# Patient Record
Sex: MALE | Race: BLACK OR AFRICAN AMERICAN
[De-identification: names, ages, dates, MRNs, and addresses within clinical notes are randomized per-mention and may not be internally consistent; named-entity substitution may affect disease eponyms.]

---

## 2018-05-30 ENCOUNTER — HOSPITAL ENCOUNTER (EMERGENCY)
Dept: HOSPITAL 72 - EMR | Age: 48
Discharge: HOME | End: 2018-05-30
Payer: COMMERCIAL

## 2018-05-30 VITALS — HEIGHT: 65 IN | WEIGHT: 215 LBS | BODY MASS INDEX: 35.82 KG/M2

## 2018-05-30 VITALS — SYSTOLIC BLOOD PRESSURE: 134 MMHG | DIASTOLIC BLOOD PRESSURE: 78 MMHG

## 2018-05-30 VITALS — DIASTOLIC BLOOD PRESSURE: 78 MMHG | SYSTOLIC BLOOD PRESSURE: 134 MMHG
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
## 2018-05-30 DIAGNOSIS — Z23: ICD-10-CM

## 2018-05-30 DIAGNOSIS — S81.012A: Primary | ICD-10-CM

## 2018-05-30 PROCEDURE — 90471 IMMUNIZATION ADMIN: CPT

## 2018-05-30 PROCEDURE — 90715 TDAP VACCINE 7 YRS/> IM: CPT

## 2018-05-30 PROCEDURE — 96372 THER/PROPH/DIAG INJ SC/IM: CPT

## 2018-05-30 PROCEDURE — 12032 INTMD RPR S/A/T/EXT 2.6-7.5: CPT

## 2018-05-30 PROCEDURE — 99283 EMERGENCY DEPT VISIT LOW MDM: CPT

## 2018-05-30 NOTE — EMERGENCY ROOM REPORT
History of Present Illness


General


Chief Complaint:  Laceration


Source:  Patient





Present Illness


HPI


47-year-old male p/w laceration to left knee. sustained when he fell, he has 

been able to ambulate without issue and has no pain with any movement of his 

like. no other complaints. 


Tdap is not up-to-date


Allergies:  


Coded Allergies:  


     No Known Allergies (Unverified , 5/30/18)





Patient History


Past Medical History:  see triage record


Past Surgical History:  none


Pertinent Family History:  none


Reviewed Nursing Documentation:  PMH: Agreed; PSxH: Agreed





Nursing Documentation-PMH


Past Medical History:  No Stated History





Review of Systems


All Other Systems:  negative except mentioned in HPI





Physical Exam





Vital Signs








  Date Time  Temp Pulse Resp B/P (MAP) Pulse Ox O2 Delivery O2 Flow Rate FiO2


 


5/30/18 01:39 98.3 94 18 132/74 99 Room Air  





 98.2       








Sp02 EP Interpretation:  reviewed, normal


General Appearance:  normal inspection, well appearing, no apparent distress, 

alert, GCS 15, non-toxic


Head:  normocephalic, atraumatic


Eyes:  bilateral eye normal inspection, bilateral eye PERRL, bilateral eye EOMI


ENT:  normal ENT inspection


Neck:  normal inspection


Respiratory:  normal inspection


Cardiovascular #1:  normal inspection


Cardiovascular #2:  2+ radial (R), 2+ radial (L)


Gastrointestinal:  normal inspection, soft


Genitourinary:  no CVA tenderness


Musculoskeletal:  other - Gaping laceration noted to the lateral knee, about 4 

centimeters, however patient has full range of motion of knee and good motor 

strength without issue no laxity


Neurologic:  normal inspection, alert, oriented x3, responsive, motor strength/

tone normal, sensory intact, normal gait, speech normal


Psychiatric:  normal inspection, judgement/insight normal, memory normal


Skin:  normal inspection, normal color, no rash, warm/dry, well hydrated, 

normal turgor





Procedures


Laceration/Wound Repair


Laceration/Wound Repair :  


   Consent:  Verbal


   Wound Location:  lower extremity


   Wound's Depth, Shape:  superficial


   Wound Length (cm):  4


   Irrigated w/ Saline (ccs):  500


   Betadine Prep?:  Yes


   Anesthesia:  1% Lidocaine


   Volume Anesthetic (ccs):  5


   Wound Repaired With:  sutures


   Suture Size/Type:  4:0, nylon


   Number of Sutures:  11


   Deep Layer Suture Size/Type:  4:0, chromic


   Number Deep Layer Sutures:  3


   Sterile Dressing Applied?:  Yes


   Patient Tolerated:  Well


   Complications:  None





Medical Decision Making


Diagnostic Impression:  


 Primary Impression:  


 Knee laceration


ER Course


47-year-old male p/w laceration





DDX:


Laceration, no signs of infection





ER course:


Laceration repaired, bacitracin with sterile dressing applied. 


Tdap given. 





Disposition:


Patient will be discharged home. Strict return precautions discussed with 

patient such as fever, chills, increasing bleeding to site, purulent drainage, 

rapid swelling or redness to area. Patient verbalizes understanding. 


Patient instructed to return to ED or their primary care doctor in 14 days for 

removal of sutures. Patient agrees with plan.


 


Please note that this Emergency Department Report was dictated using Si[employer] technology software, occasionally this can lead to 

erroneous entry secondary to interpretation by the dictation equipment





Last Vital Signs








  Date Time  Temp Pulse Resp B/P (MAP) Pulse Ox O2 Delivery O2 Flow Rate FiO2


 


5/30/18 01:39 98.3 94 18 132/74 99 Room Air  





 98.2       








Disposition:  HOME, SELF-CARE


Condition:  Improved


Patient Instructions:  Laceration Care, Adult





Additional Instructions:  


PLEASE SEE YOUR PCP OR COME BACK TO THE EMERGENCY ROOM IN 14 DAYS FOR SUTURE 

REMOVAL











Xiomy Barbosa M.D. May 30, 2018 02:41

## 2018-06-13 ENCOUNTER — HOSPITAL ENCOUNTER (EMERGENCY)
Dept: HOSPITAL 72 - EMR | Age: 48
LOS: 1 days | Discharge: HOME | End: 2018-06-14
Payer: COMMERCIAL

## 2018-06-13 VITALS — SYSTOLIC BLOOD PRESSURE: 136 MMHG | DIASTOLIC BLOOD PRESSURE: 78 MMHG

## 2018-06-13 VITALS — HEIGHT: 66 IN | WEIGHT: 250 LBS | BODY MASS INDEX: 40.18 KG/M2
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
## 2018-06-13 DIAGNOSIS — S81.012D: Primary | ICD-10-CM

## 2018-06-13 DIAGNOSIS — Z48.02: ICD-10-CM

## 2018-06-13 PROCEDURE — 99282 EMERGENCY DEPT VISIT SF MDM: CPT

## 2018-06-13 NOTE — EMERGENCY ROOM REPORT
History of Present Illness


General


Chief Complaint:  Wound Recheck/Suture Removal


Source:  Patient





Present Illness


HPI


Is a 47-year-old male who presents with chief complaint of suture removal.  He 

fell on May 30 and sustained a laceration to his left knee.  He was seen here 

and had sutured done.  no Complications since.  He came in for suture removal.


Allergies:  


Coded Allergies:  


     No Known Allergies (Unverified , 5/30/18)





Patient History


Past Medical History:  see triage record, old chart reviewed


Past Surgical History:  other


Pertinent Family History:  none


Social History:  Denies: smoking


Immunizations:  other


Reviewed Nursing Documentation:  PMH: Agreed; PSxH: Agreed





Nursing Documentation-PMH


Hx Diabetes:  Yes





Review of Systems


Eye:  Denies: eye pain, blurred vision


ENT:  Denies: ear pain, nose congestion, throat swelling


Respiratory:  Denies: cough, shortness of breath


Cardiovascular:  Denies: chest pain, palpitations


Gastrointestinal:  Denies: abdominal pain, diarrhea, nausea, vomiting


Musculoskeletal:  Denies: back pain, joint pain


Skin:  Denies: rash


Neurological:  Denies: headache, numbness


Endocrine:  Denies: increased thirst, increased urine


Hematologic/Lymphatic:  Denies: easy bruising


All Other Systems:  negative except mentioned in HPI





Physical Exam





Vital Signs








  Date Time  Temp Pulse Resp B/P (MAP) Pulse Ox O2 Delivery O2 Flow Rate FiO2


 


6/13/18 23:11 98.6 107 16 131/86 94 Room Air  





 98.6       





vital signs unremarkab


Sp02 EP Interpretation:  reviewed, normal


General Appearance:  well appearing, no apparent distress, alert


Head:  normocephalic, atraumatic


Eyes:  bilateral eye PERRL, bilateral eye EOMI


ENT:  hearing grossly normal, normal pharynx


Neck:  full range of motion, supple, no meningismus


Respiratory:  chest non-tender, lungs clear, normal breath sounds


Cardiovascular #1:  regular rate, rhythm, no murmur


Gastrointestinal:  normal bowel sounds, non tender, no mass, no organomegaly, 

no bruit, non-distended


Musculoskeletal:  back normal, gait/station normal, normal range of motion, 

other - left knee: Suture site clean without evidence of infection.


Psychiatric:  mood/affect normal


Skin:  warm/dry





Procedures


Additional Procedure


Procedure Narrative


Procedure: Suture removal


Indication: Scheduled removal


Description: I cleaned the area with chlorhexidine.  I remove the suture using 

a scissor and tweezer.  No complication.  Patient tolerated procedure without 

problem.





Medical Decision Making


Diagnostic Impression:  


 Primary Impression:  


 Encounter for removal of sutures


ER Course


Patient here for suture removal.  No complication.  No infection.





Last Vital Signs








  Date Time  Temp Pulse Resp B/P (MAP) Pulse Ox O2 Delivery O2 Flow Rate FiO2


 


6/13/18 23:20  78 18 136/78 96 Room Air  


 


6/13/18 23:11 98.6       





 98.6       








Status:  improved


Disposition:  HOME, SELF-CARE


Condition:  Stable


Referrals:  


NON PHYSICIAN (PCP)





Additional Instructions:  


follow-up with your DrSara in 7 days as needed. Return for any concerns.











REKHA ENRIQUE M.D. Jun 13, 2018 23:43

## 2018-06-14 VITALS — SYSTOLIC BLOOD PRESSURE: 140 MMHG | DIASTOLIC BLOOD PRESSURE: 75 MMHG

## 2018-06-14 VITALS — DIASTOLIC BLOOD PRESSURE: 75 MMHG | SYSTOLIC BLOOD PRESSURE: 140 MMHG

## 2019-06-15 ENCOUNTER — HOSPITAL ENCOUNTER (EMERGENCY)
Dept: HOSPITAL 72 - EMR | Age: 49
Discharge: HOME | End: 2019-06-15
Payer: COMMERCIAL

## 2019-06-15 VITALS — DIASTOLIC BLOOD PRESSURE: 89 MMHG | SYSTOLIC BLOOD PRESSURE: 134 MMHG

## 2019-06-15 VITALS — HEIGHT: 65 IN | WEIGHT: 209 LBS | BODY MASS INDEX: 34.82 KG/M2

## 2019-06-15 DIAGNOSIS — E11.9: ICD-10-CM

## 2019-06-15 DIAGNOSIS — L03.011: Primary | ICD-10-CM

## 2019-06-15 PROCEDURE — 99282 EMERGENCY DEPT VISIT SF MDM: CPT

## 2019-06-15 PROCEDURE — 10060 I&D ABSCESS SIMPLE/SINGLE: CPT

## 2019-06-15 NOTE — EMERGENCY ROOM REPORT
History of Present Illness


General


Chief Complaint:  Pain


Source:  Patient





Present Illness


HPI


This is a 48-year-old male who is right-hand dominant.  He presents with chief 

complaint of right fifth finger pain.  Onset for last 4 days.  He had a 

hangnail that he pulled off.  Now swollen and tender.  Pain is 8 out of 10.  

Worse with movement or palpation.  Better with rest.  No drainage.  No fever.  

Denies any other complaint.


Allergies:  


Coded Allergies:  


     No Known Allergies (Unverified , 5/30/18)





Patient History


Past Medical History:  see triage record, old chart reviewed, DM


Past Surgical History:  other


Pertinent Family History:  none


Social History:  Denies: smoking


Immunizations:  other


Reviewed Nursing Documentation:  PMH: Agreed; PSxH: Agreed





Nursing Documentation-PMH


Past Medical History:  No History, Except For


Hx Diabetes:  Yes - 2





Review of Systems


Eye:  Denies: eye pain, blurred vision


ENT:  Denies: ear pain, nose congestion, throat swelling


Respiratory:  Denies: cough, shortness of breath


Cardiovascular:  Denies: chest pain, palpitations


Gastrointestinal:  Denies: abdominal pain, diarrhea, nausea, vomiting


Musculoskeletal:  Reports: joint pain; Denies: back pain


Skin:  Denies: rash


Neurological:  Denies: headache, numbness


Endocrine:  Denies: increased thirst, increased urine


Hematologic/Lymphatic:  Denies: easy bruising


All Other Systems:  negative except mentioned in HPI





Physical Exam





Vital Signs








  Date Time  Temp Pulse Resp B/P (MAP) Pulse Ox O2 Delivery O2 Flow Rate FiO2


 


6/15/19 20:44 98.8 74 16 138/91 (107) 97 Room Air  





Vitals unremarkable


Sp02 EP Interpretation:  reviewed, normal


General Appearance:  well appearing, no apparent distress, alert


Head:  normocephalic, atraumatic


Eyes:  bilateral eye PERRL, bilateral eye EOMI


ENT:  hearing grossly normal, normal pharynx


Neck:  full range of motion, supple, no meningismus


Respiratory:  chest non-tender, lungs clear, normal breath sounds


Cardiovascular #1:  regular rate, rhythm, no murmur


Gastrointestinal:  normal bowel sounds, non tender, no mass, no organomegaly, 

no bruit, non-distended


Musculoskeletal:  back normal, gait/station normal, normal range of motion, 

other - Right fifth finger: Paronychia on the lateral aspect.  No felon


Psychiatric:  mood/affect normal


Skin:  warm/dry





Procedures


Incision and Drainage


Incision and Drainage :  


   Consent:  Verbal


   Site:  Rt 5th finger


   Blade Size:  11


   I & D Procedure:  betadine prep


   Patient Tolerated:  Well


   Complications:  None


Progress


Using an 11 blade scalpel, I ran along the cuticle of the fifth finger.  There 

was small amount of pus expressed.  Patient tolerated procedure without any 

problem.





Medical Decision Making


Diagnostic Impression:  


 Primary Impression:  


 Paronychia of finger of right hand


ER Course


Patient with paronychia. no felon. no deep infection,.





Last Vital Signs








  Date Time  Temp Pulse Resp B/P (MAP) Pulse Ox O2 Delivery O2 Flow Rate FiO2


 


6/15/19 20:44 98.8 74 16 138/91 (107) 97 Room Air  








Status:  improved


Disposition:  HOME, SELF-CARE


Condition:  Stable


Scripts


Trimethoprim/Sulfamethoxazole 160/800* (BACTRIM DS TABLET*) 1 Each Tablet


1 TAB ORAL Q12H, #14 TAB 0 Refills


   Prov: Mervin Zuleta MD         6/15/19





Additional Instructions:  


Keep wound clean. clean first with hydrogen peroxide. Then apply antibiotic 

ointment. Follow up with your doctor in 7 days for  recheck. Return if worse.











Mervin Zuleta MD Jigar 15, 2019 21:02

## 2019-06-15 NOTE — NUR
ED Nurse Note:

 Pt arrived ED from home, c/o right the 5 th finger was injuried and pain with 
abscess. Pt is A/O X 4. VSS, waing for orders.

## 2019-06-15 NOTE — NUR
ER DISCHARGE NOTE:

Patient is cleared to be discharged per Dr. Zuleta. I&D  was done by MD. Pt is 
aox4 on room air with stable vital signs. Pt  was given dc and prescription 
instructions. Pt was able to verbalize understanding. Pt's  id band removed. Pt 
is able to ambulate with steady gait and took all belongings.

## 2019-08-08 ENCOUNTER — HOSPITAL ENCOUNTER (EMERGENCY)
Dept: HOSPITAL 72 - EMR | Age: 49
Discharge: HOME | End: 2019-08-08
Payer: COMMERCIAL

## 2019-08-08 VITALS — DIASTOLIC BLOOD PRESSURE: 77 MMHG | SYSTOLIC BLOOD PRESSURE: 134 MMHG

## 2019-08-08 VITALS — WEIGHT: 203 LBS | HEIGHT: 67 IN | BODY MASS INDEX: 31.86 KG/M2

## 2019-08-08 DIAGNOSIS — E11.9: ICD-10-CM

## 2019-08-08 DIAGNOSIS — Z79.4: ICD-10-CM

## 2019-08-08 DIAGNOSIS — S39.012A: Primary | ICD-10-CM
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
## 2019-08-08 PROCEDURE — 96372 THER/PROPH/DIAG INJ SC/IM: CPT

## 2019-08-08 PROCEDURE — 99283 EMERGENCY DEPT VISIT LOW MDM: CPT

## 2019-08-08 NOTE — NUR
ER DISCHARGE NOTE:

Patient is cleared to be discharged per ERMD, pt is aox4, on room air, with 
stable vital signs. pt was given dc and prescription instructions, pt was able 
to verbalize understanding, pt id band  removed without complications. pt is 
able to ambulate with can use to steady gait. pt took all belongings.

## 2019-08-08 NOTE — EMERGENCY ROOM REPORT
History of Present Illness


General


Chief Complaint:  Lower Back Pain or Injury


Source:  Patient





Present Illness


HPI


49-year-old male with history of type 2 diabetes currently controlled with 

insulin here complaining of 1 day of lower back pain that started after doing 

some yard work yesterday.  Patient denies falling on his back or injuring his 

back.  Patient reports that he was leaning forward working in his yard as he 

suddenly felt pulled muscle being spastic in the left lower back radiating to 

the left leg.  Patient denies any tingling numbness, saddle paresthesia, 

urinary or bowel incontinence.  Patient reports that his pain is 5 out of 10 

localized to lumbar region without any radiation at this time.  Has taken Advil 

with minimal relief.denies urinary frequency, hematuria, pain radiation to the 

pubic area.  Denies history of renal injury.


Allergies:  


Coded Allergies:  


     No Known Allergies (Unverified , 5/30/18)





Patient History


Past Medical History:  see triage record


Past Surgical History:  unable to obtain


Pertinent Family History:  none


Immunizations:  UTD


Reviewed Nursing Documentation:  PMH: Agreed; PSxH: Agreed





Nursing Documentation-PMH


Past Medical History:  No History, Except For


Hx Diabetes:  Yes





Review of Systems


All Other Systems:  negative except mentioned in HPI





Physical Exam





Vital Signs








  Date Time  Temp Pulse Resp B/P (MAP) Pulse Ox O2 Delivery O2 Flow Rate FiO2


 


8/8/19 12:17 98.6 72 18 134/77 (96) 96 Room Air  








Sp02 EP Interpretation:  reviewed, normal


General Appearance:  normal inspection, well appearing, no apparent distress, 

alert


Head:  normocephalic, atraumatic


Eyes:  bilateral eye normal inspection, bilateral eye PERRL


ENT:  normal ENT inspection, hearing grossly normal, normal pharynx, no 

angioedema


Neck:  normal inspection, full range of motion, supple, no meningismus


Respiratory:  normal inspection, chest non-tender, lungs clear, normal breath 

sounds, no rhonchi, no respiratory distress, no wheezing


Cardiovascular #1:  normal inspection, normal peripheral pulses, regular rate, 

rhythm, no gallop, no murmur


Gastrointestinal:  normal inspection, soft


Genitourinary:  no CVA tenderness


Musculoskeletal:  gait/station normal, normal range of motion, non-tender


Neurologic:  normal inspection, alert, oriented x3


Psychiatric:  normal inspection, judgement/insight normal


Skin:  no rash


Lymphatic:  normal inspection, no adenopathy





Medical Decision Making


PA Attestation


All diagnoses and treatment plans were reviewed and discussed with my 

supervising physician Dr. Brooks


Diagnostic Impression:  


 Primary Impression:  


 Lumbar spine strain


ER Course


49-year-old male with history of type 2 diabetes currently controlled with 

insulin here complaining of 1 day of lower back pain that started after doing 

some yard work yesterday.  Patient denies falling on his back or injuring his 

back.  Patient reports that he was leaning forward working in his yard as he 

suddenly felt pulled muscle being spastic in the left lower back radiating to 

the left leg.  Patient denies any tingling numbness, saddle paresthesia, 

urinary or bowel incontinence.  Patient reports that his pain is 5 out of 10 

localized to lumbar region without any radiation at this time.  Has taken Advil 

with minimal relief.denies urinary frequency, hematuria, pain radiation to the 

pubic area.  Denies history of renal injury.








Ddx considered but are not limited to: Lumbar spine sprain, strain, fracture, 

contusion, neuropathy











Vital signs: are WNL, pt. is afebrile








 H&PE are most consistent with: lumbar spine strain














ORDERS: Robaxin, naproxen





ER intervention: Toradol 30 mg IM








DISCHARGE: At this time pt. is stable for d/c to home. Will provide printed 

patient care instructions, and any necessary prescriptions. Care plan and 

follow up instructions have been discussed with the patient prior to discharge.


At this time no x-ray is needed as patient did fall or injure his back.  

Patient has a presentation of muscle spasm and strain.  Reports that he feels 

better after the Toradol injection.  Follow-up with a primary care provider.  

Return to emergency room if worsening symptoms.





Last Vital Signs








  Date Time  Temp Pulse Resp B/P (MAP) Pulse Ox O2 Delivery O2 Flow Rate FiO2


 


8/8/19 12:17 98.6 72 18 134/77 (96) 96 Room Air  








Disposition:  HOME, SELF-CARE


Condition:  Stable


Scripts


Naproxen* (NAPROXEN*) 500 Mg Tablet


500 MG ORAL TWICE A DAY, #21 TAB


   Prov: Laura Flynn         8/8/19 


Methocarbamol* (ROBAXIN*) 500 Mg Tablet


500 MG PO TID, #21 TAB 0 Refills


   Prov: Laura Flynn         8/8/19


Patient Instructions:  Low Back Sprain With [facility]





Additional Instructions:  


Take medication as directed follow-up with your primary care provider for 

referral to physical therapy avoid strenuous physical activity if worsening 

symptoms return to the emergency room at this time no imaging is needed as you 

did not fall your back











Laura Flynn Aug 8, 2019 12:42

## 2020-10-15 ENCOUNTER — HOSPITAL ENCOUNTER (EMERGENCY)
Dept: HOSPITAL 72 - EMR | Age: 50
Discharge: HOME | End: 2020-10-15
Payer: COMMERCIAL

## 2020-10-15 VITALS — SYSTOLIC BLOOD PRESSURE: 135 MMHG | DIASTOLIC BLOOD PRESSURE: 84 MMHG

## 2020-10-15 VITALS — HEIGHT: 66 IN | WEIGHT: 213 LBS | BODY MASS INDEX: 34.23 KG/M2

## 2020-10-15 VITALS — SYSTOLIC BLOOD PRESSURE: 138 MMHG | DIASTOLIC BLOOD PRESSURE: 88 MMHG

## 2020-10-15 DIAGNOSIS — Z79.84: ICD-10-CM

## 2020-10-15 DIAGNOSIS — E11.9: ICD-10-CM

## 2020-10-15 DIAGNOSIS — L03.012: Primary | ICD-10-CM

## 2020-10-15 PROCEDURE — 10140 I&D HMTMA SEROMA/FLUID COLLJ: CPT

## 2020-10-15 PROCEDURE — 99283 EMERGENCY DEPT VISIT LOW MDM: CPT

## 2020-10-15 PROCEDURE — 90715 TDAP VACCINE 7 YRS/> IM: CPT

## 2020-10-15 PROCEDURE — 90471 IMMUNIZATION ADMIN: CPT

## 2020-10-15 NOTE — NUR
ED Nurse Note:





PT walked in to ed for C/O pain and swelling to tip of left ring finger x 2 
days.  yellowish puss in visible under the skin.

## 2020-10-15 NOTE — EMERGENCY ROOM REPORT
History of Present Illness


General


Chief Complaint:  Upper Extremity Injury


Source:  Patient





Present Illness


HPI


50-year-old -American male with history of diabetes 

(non-insulin-dependent) right-hand-dominant presents with paronychia to the left

fourth index finger x3 days.


Denies fevers, chills, nausea, vomiting, diarrhea, chest pain, shortness of 

breath, or other symptoms.


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  Denies

any new weakness.  He had a previous surgery to the left wrist.


States he has had paronychia before that required drainage. 


Unknown Tdap status


The patient's symptoms were gradual onset, severity was moderate, duration since

3 days.  


Quality: aching





Past medical history: Diabetes


Past surgical history:  L wrist surgery





Smoking:  Denies


Alcohol use:  Denies


Drug use:  Denies





Review of systems:


CONST: No fevers or chills, No night sweats


PULMONARY: No productive cough,  No shortness of breath 


CARDIAC: No chest pain, No palpitations 


GI: No vomiting, No diarrhea , No melena_or_BRBPR 


: No dysuria, No hematuria, No discharge 


NEURO: No new_focal_weakness_or_numbness, No confusion, No vision changes


14 point Review of Systems is otherwise negative except per HPI





Physical Exam:


GENERAL: Awake_alert_ nontoxic, no acute distress Spo2 98% on RA -normal


EYES: Extraocular muscles are intact. Conjunctivae clear. Lids without swelling


ENT: External nose and ear normal_in_appearance. Oropharynx clear. 

Head_atraumatic, Moist_oral_mucosa


NECK:  No JVD. No meningismus. No thyromegaly.  Supple. Trachea midline


RESP: Normal respiratory effort. Symmetric rise. No stridor. 

Clear_to_auscultation_No_rales_No_wheezes


CARDIAC: Regular rate and regular rhytm. No_significant pedal edema.


ABDOMEN: Soft. Nondistended.  Nontender_No_rebound_or_guarding.


MSK:  Normal muscle tone, without rigidity.  Extremities without asymmetric 

deformity or swelling.  


SKIN: Warm and dry.  No visible cyanosis or pallor


Left 4th finger paronychia.  No felon.  No herpetic riley.  No pain with 

flexion or extension of the fourth digit.  Cannot asses flexion and extension at

the MCP, PIP and DIP 2/2 to ulnar neuropathy. 


No pain with passive extension of digit. No cellulitis. 


NEUROLOGIC: Alert, oriented x3.  Motor_and_sensation_grossly_intact. No truncal 

ataxia. Gait_normal


Psych: Normal mood and affect, normal judgment and insight











- COORDINATION OF CARE


Case was discussed with: Patient  








Medical Decision Making/Plan:





Differential diagnosis: Paronychia versus abscess versus cellulitis versus felon





50-year-old right-hand-dominant male presents with left fourth finger 

paronychia.


Tdap is up-to-date.  Patient received first dose of antibiotic here in the ER.  

Incision and drainage was done of the paronychia and was successful.  No 

complications.  I drained about 2 cc of purulent material from the fourth 

finger.  [place] signs are negative.  No signs of flexor tenosynovitis or os

teomyelitis.  Recommend close follow-up.  Patient was educated to keep close 

glucose control of his fourth finger expedite healing.  Recommend wound check in

2 days.  Wound was dressed with bacitracin and gauze.  Will DC with Augmentin 

and pain control.





Pertinent results reviewed with the patient. I educated the patient on the 

current treatment plan including the risks, benefits, and alternatives. I also 

discussed the extent and limitations of the current evaluation. The patient 

expressed understanding and agreement with plan. I recommended PMD follow-up 

within 1-2 days. Also advised that the patient return to the Emergency Departm

ent as soon as possible if they experience any new, persistent, or worsening 

symptoms.


Allergies:  


Coded Allergies:  


     No Known Allergies (Unverified , 5/30/18)





COVID-19 Screening


Contact w/high risk pt:  No


Experienced COVID-19 symptoms?:  No


COVID-19 Testing performed PTA:  No





Nursing Documentation-PMH


Past Medical History:  No History, Except For


Hx Diabetes:  Yes





Physical Exam





Vital Signs








  Date Time  Temp Pulse Resp B/P (MAP) Pulse Ox O2 Delivery O2 Flow Rate FiO2


 


10/15/20 17:44 98.1 100 20 135/84 (101) 96 Room Air  








Sp02 EP Interpretation:  reviewed, normal


General Appearance:  normal inspection





Procedures


Incision and Drainage


Progress


Abscess Incision and Drainage with irrigation by me:


 Location: Left fourth finger paronychia 


 Anesthesia: Local 1% Lidocaine


 Technique: Irrigated. Disrupted loculations w/ instrumentation


 Packing: None


 Complications: Neurovascularly intact post procedure


patient tolerated procedure well without complications





Medical Decision Making


Diagnostic Impression:  


   Primary Impression:  


   Paronychia due to ingrown nail


   Additional Impression:  


   Diabetes





Last Vital Signs








  Date Time  Temp Pulse Resp B/P (MAP) Pulse Ox O2 Delivery O2 Flow Rate FiO2


 


10/15/20 17:51 98.1 100 20 135/84 96 Room Air  








Disposition:  HOME, SELF-CARE


Admit Decision Time:  18:02


Condition:  Stable


Scripts


Naproxen* (NAPROXEN*) 500 Mg Tablet.dr


500 MG ORAL TWICE A DAY for 7 Days, #14 TAB


   Prov: Leigh Ann Garcia D.O.         10/15/20 


Amoxicillin/Potassium Clav 875-125* (AUGMENTIN 875-125 TABLET*) 1 Each Tablet


1 TAB ORAL TWICE A DAY, #14 TAB


   Prov: Leigh Ann Garcia D.O.         10/15/20


Referrals:  


NON PHYSICIAN (PCP)


Patient Instructions:  Abscess, Easy-to-Read





Additional Instructions:  








Instructions for patient/caretaker:


Follow up with your physician in 1-2 days for wound check


Keep your diabetes under control


Follow-up with your doctor sooner if your condition requires a more timely 

clinical reevaluation.


Return to the emergency department immediately if you feel that your condition 

is worsening or if you have any new or concerning symptoms.


Review your discharge instructions and take any prescriptions given as 

instructed.














[facility] PROVIDES FREE OR LOW-COST HEALTH SERVICES TO PEOPLE WHO CAN SHOW 

PROOF THAT THEY LIVE IN [facility].


TO FIND MORE CLINICS PARTNERED WITH THE [facility] TO PROVIDE SERVICE, PLEASE CALL 

[phone].











Leigh Ann Garcia D.O.           Oct 15, 2020 18:03

## 2020-10-15 NOTE — NUR
ER DISCHARGE NOTE

affected area is cleansed and dressed. 

Patient is cleared to be discharged per ERMD, pt is aox4, on room air, with 
stable vital signs. pt was given dc and prescription instructions, pt was able 
to verbalize understanding, pt id band removed without complications. pt is 
able to ambulate with steady gait. pt took all belongings.

## 2020-10-15 NOTE — NUR
-------------------------------------------------------------------------------

            *** Note ryanone in EDM - 10/15/20 at 1818 by [email or web address] ***             

-------------------------------------------------------------------------------

ED Nurse Note:



Pt cleared by health care Provider for discharge.  DC instructions/prescription 
was given and explained to pt and mother. Pt's mother verbalized understanding 
of teachings. All medical deviecs such as ID band  removed. Pt is AAO x4, 
ambulatory and left with all personal belongings.